# Patient Record
Sex: MALE | Race: WHITE | NOT HISPANIC OR LATINO | Employment: OTHER | URBAN - METROPOLITAN AREA
[De-identification: names, ages, dates, MRNs, and addresses within clinical notes are randomized per-mention and may not be internally consistent; named-entity substitution may affect disease eponyms.]

---

## 2021-03-17 ENCOUNTER — IMMUNIZATION (OUTPATIENT)
Dept: INTERNAL MEDICINE | Facility: CLINIC | Age: 66
End: 2021-03-17
Payer: MEDICARE

## 2021-03-17 DIAGNOSIS — Z23 NEED FOR VACCINATION: Primary | ICD-10-CM

## 2021-03-17 PROCEDURE — 91300 COVID-19, MRNA, LNP-S, PF, 30 MCG/0.3 ML DOSE VACCINE: CPT | Mod: PBBFAC | Performed by: FAMILY MEDICINE

## 2021-04-07 ENCOUNTER — IMMUNIZATION (OUTPATIENT)
Dept: INTERNAL MEDICINE | Facility: CLINIC | Age: 66
End: 2021-04-07
Payer: MEDICARE

## 2021-04-07 DIAGNOSIS — Z23 NEED FOR VACCINATION: Primary | ICD-10-CM

## 2021-04-07 PROCEDURE — 91300 COVID-19, MRNA, LNP-S, PF, 30 MCG/0.3 ML DOSE VACCINE: CPT | Mod: PBBFAC | Performed by: FAMILY MEDICINE

## 2021-04-07 PROCEDURE — 0002A COVID-19, MRNA, LNP-S, PF, 30 MCG/0.3 ML DOSE VACCINE: CPT | Mod: PBBFAC | Performed by: FAMILY MEDICINE

## 2023-01-20 ENCOUNTER — HOSPITAL ENCOUNTER (EMERGENCY)
Facility: HOSPITAL | Age: 68
Discharge: HOME | End: 2023-01-20
Attending: EMERGENCY MEDICINE
Payer: MEDICARE

## 2023-01-20 VITALS
SYSTOLIC BLOOD PRESSURE: 134 MMHG | HEART RATE: 82 BPM | OXYGEN SATURATION: 96 % | RESPIRATION RATE: 20 BRPM | TEMPERATURE: 98 F | BODY MASS INDEX: 39.17 KG/M2 | DIASTOLIC BLOOD PRESSURE: 91 MMHG | WEIGHT: 315 LBS | HEIGHT: 75 IN

## 2023-01-20 DIAGNOSIS — S68.119A AMPUTATION OF FINGER TIP, INITIAL ENCOUNTER: Primary | ICD-10-CM

## 2023-01-20 PROCEDURE — 99284 EMERGENCY DEPT VISIT MOD MDM: CPT | Mod: 25,ER

## 2023-01-20 PROCEDURE — 63600175 PHARM REV CODE 636 W HCPCS: Mod: ER | Performed by: PHYSICIAN ASSISTANT

## 2023-01-20 PROCEDURE — 96372 THER/PROPH/DIAG INJ SC/IM: CPT | Performed by: PHYSICIAN ASSISTANT

## 2023-01-20 PROCEDURE — 25000003 PHARM REV CODE 250: Mod: ER | Performed by: PHYSICIAN ASSISTANT

## 2023-01-20 PROCEDURE — 29130 APPL FINGER SPLINT STATIC: CPT | Mod: LT,ER

## 2023-01-20 RX ORDER — CEPHALEXIN 500 MG/1
500 CAPSULE ORAL EVERY 8 HOURS
Qty: 15 CAPSULE | Refills: 0 | Status: SHIPPED | OUTPATIENT
Start: 2023-01-20 | End: 2023-01-25

## 2023-01-20 RX ORDER — BUPIVACAINE HYDROCHLORIDE 5 MG/ML
10 INJECTION, SOLUTION EPIDURAL; INTRACAUDAL
Status: COMPLETED | OUTPATIENT
Start: 2023-01-20 | End: 2023-01-20

## 2023-01-20 RX ORDER — HYDROCODONE BITARTRATE AND ACETAMINOPHEN 10; 325 MG/1; MG/1
1 TABLET ORAL EVERY 6 HOURS PRN
Qty: 18 TABLET | Refills: 0 | Status: SHIPPED | OUTPATIENT
Start: 2023-01-20

## 2023-01-20 RX ORDER — CEFAZOLIN SODIUM 1 G/3ML
1 INJECTION, POWDER, FOR SOLUTION INTRAMUSCULAR; INTRAVENOUS
Status: COMPLETED | OUTPATIENT
Start: 2023-01-20 | End: 2023-01-20

## 2023-01-20 RX ORDER — HYDROCODONE BITARTRATE AND ACETAMINOPHEN 10; 325 MG/1; MG/1
1 TABLET ORAL
Status: COMPLETED | OUTPATIENT
Start: 2023-01-20 | End: 2023-01-20

## 2023-01-20 RX ADMIN — BUPIVACAINE HYDROCHLORIDE 50 MG: 5 INJECTION, SOLUTION EPIDURAL; INTRACAUDAL; PERINEURAL at 05:01

## 2023-01-20 RX ADMIN — CEFAZOLIN 1 G: 330 INJECTION, POWDER, FOR SOLUTION INTRAMUSCULAR; INTRAVENOUS at 07:01

## 2023-01-20 RX ADMIN — HYDROCODONE BITARTRATE AND ACETAMINOPHEN 1 TABLET: 10; 325 TABLET ORAL at 05:01

## 2023-01-21 NOTE — ED PROVIDER NOTES
History      Chief Complaint   Patient presents with    Finger Injury     L thumb cut with a electric saw , bleeding under control, mangled thumb, unknown last T Dap        Review of patient's allergies indicates:  No Known Allergies     HPI   HPI    1/20/2023, 6:40 PM   History obtained from the patient      History of Present Illness: Jose Elias Allen is a 67 y.o. male patient who presents to the Emergency Department for left thumb tip cut off from skill saw pta.  Last td within 5 years per epic. Symptoms are moderate in severity.     No further complaints or concerns at this time.           PCP: Tulio Guillory MD       Past Medical History:  Past Medical History:   Diagnosis Date    Head trauma     Hypertension     PTSD (post-traumatic stress disorder)          Past Surgical History:  Past Surgical History:   Procedure Laterality Date    APPENDECTOMY             Family History:  No family history on file.        Social History:  Social History     Tobacco Use    Smoking status: Every Day     Types: Cigarettes    Smokeless tobacco: Not on file   Substance and Sexual Activity    Alcohol use: No    Drug use: No    Sexual activity: Not on file       ROS     Review of Systems   Constitutional:  Negative for chills and fever.   HENT:  Negative for facial swelling and trouble swallowing.    Eyes:  Negative for pain and discharge.   Respiratory:  Negative for chest tightness and shortness of breath.    Cardiovascular:  Negative for palpitations and leg swelling.   Gastrointestinal:  Negative for diarrhea and vomiting.   Endocrine: Negative for polydipsia and polyuria.   Genitourinary:  Negative for decreased urine volume and flank pain.   Musculoskeletal:  Negative for joint swelling and neck stiffness.   Skin:  Positive for wound. Negative for rash.   Neurological:  Negative for syncope and light-headedness.   All other systems reviewed and are negative.    Physical Exam      Initial Vitals [01/20/23 1731]   BP Pulse  "Resp Temp SpO2   (!) 141/70 84 (!) 22 97.6 °F (36.4 °C) 95 %      MAP       --         Physical Exam  Vital signs and nursing notes reviewed.  Constitutional: Patient is in NAD. Awake and alert. Well-developed and well-nourished.  Head: Atraumatic. Normocephalic.  Eyes: PERRL. EOM intact. Conjunctivae nl. No scleral icterus.  ENT: Mucous membranes are moist. Oropharynx is clear.  Neck: Supple. No JVD. No lymphadenopathy.  No meningismus  Cardiovascular: Regular rate and rhythm. No murmurs, rubs, or gallops. Distal pulses are 2+ and symmetric.  Pulmonary/Chest: No respiratory distress. Clear to auscultation bilaterally. No wheezing, rales, or rhonchi.  Abdominal: Soft. Non-distended. No TTP. No rebound, guarding, or rigidity. Good bowel sounds.  Genitourinary: No CVA tenderness  Musculoskeletal: Moves all extremities. No edema.  Partial fingertip amputation to left thumb.  Bone covered with soft tissue            Skin: Warm and dry.  Neurological: Awake and alert. No acute focal neurological deficits are appreciated.  Psychiatric: Normal affect. Good eye contact. Appropriate in content.      ED Course          Splint Application    Date/Time: 1/20/2023 7:58 PM  Performed by: SULTANA Reddy  Authorized by: Winston Rapp MD   Location details: left thumb  Splint type: static finger  Supplies used: aluminum splint  Post-procedure: The splinted body part was neurovascularly unchanged following the procedure.  Patient tolerance: Patient tolerated the procedure well with no immediate complications      ED Vital Signs:  Vitals:    01/20/23 1731 01/20/23 1751 01/20/23 1752 01/20/23 1933   BP: (!) 141/70 (!) 153/65  (!) 134/91   Pulse: 84 82     Resp: (!) 22 20 20    Temp: 97.6 °F (36.4 °C)      TempSrc: Oral      SpO2: 95% 96%     Weight: (!) 143 kg (315 lb 4.1 oz)      Height: 6' 3" (1.905 m)                    Imaging Results:  Imaging Results              X-Ray Finger 2 or More Views Left (Final " result)  Result time 01/20/23 18:57:21      Final result by Sandi Motley MD (01/20/23 18:57:21)                   Impression:      As above      Electronically signed by: Tolu Junior  Date:    01/20/2023  Time:    18:57               Narrative:    EXAMINATION:  XR FINGER 2 OR MORE VIEWS LEFT    CLINICAL HISTORY:  XR FINGER 2 OR MORE VIEWS LEFT    COMPARISON:  None    FINDINGS:  Multiple radiographic views  were obtained.    Tuft fracture with missing portion of the soft tissues and bony structures of the distal thumb.                                         The Emergency Provider reviewed the vital signs and test results, which are outlined above.    ED Discussion             Medication(s) given in the ER:  Medications   BUPivacaine (PF) 0.5% (5 mg/mL) injection 50 mg (50 mg Subcutaneous Given 1/20/23 1754)   HYDROcodone-acetaminophen  mg per tablet 1 tablet (1 tablet Oral Given 1/20/23 1752)   ceFAZolin injection 1 g (1 g Intramuscular Given 1/20/23 1931)            Follow-up Information       Pradeep Madden MD. Schedule an appointment as soon as possible for a visit in 3 days.    Specialties: Hand Surgery, Orthopedic Surgery  Contact information:  [address] [place] [address] [address]  [place] [address] [phone]                                    Medication List        START taking these medications      cephALEXin 500 MG capsule  Commonly known as: KEFLEX  Take 1 capsule (500 mg total) by mouth every 8 (eight) hours. for 5 days     HYDROcodone-acetaminophen  mg per tablet  Commonly known as: NORCO  Take 1 tablet by mouth every 6 (six) hours as needed for Pain.               Where to Get Your Medications        [place] [facility] [place] [address] [address]  [address] [address]      Phone: [phone]   cephALEXin 500 MG capsule  HYDROcodone-acetaminophen  mg per tablet             Medical Decision Making         All findings were reviewed with the patient/family in detail.   All remaining questions and concerns were addressed at that time.  Patient/family has been counseled regarding the need for follow-up as well as the indication to return to the emergency room should new or worrisome developments occur.    Medical Decision Making:   Clinical Tests:   Radiological Study: Ordered and Reviewed  Other:   I have discussed this case with another health care provider.       <> Summary of the Discussion: Discussed with Dr. Houston, who agrees with plan antibiotics, irrigate and clean wound well, follow-up as an outpatient with hand surgeon   [facility]                 Clinical Impression:        ICD-10-CM ICD-9-CM   1. Amputation of finger tip, initial encounter  S68.119A 886.0               Marisela Hays PA-C  01/20/23 1959

## 2023-01-23 ENCOUNTER — TELEPHONE (OUTPATIENT)
Dept: ORTHOPEDICS | Facility: CLINIC | Age: 68
End: 2023-01-23
Payer: MEDICARE

## 2023-01-23 NOTE — TELEPHONE ENCOUNTER
Phoned patient to schedule his ER follow up appointment. Left a message at both numbers for patient or his wife to call back to schedule.

## 2023-01-23 NOTE — TELEPHONE ENCOUNTER
----- Message from Will Salazar PA-C sent at 1/21/2023  1:22 PM CST -----  This patient needs ortho trauma clinic follow-up this week

## 2023-01-24 ENCOUNTER — TELEPHONE (OUTPATIENT)
Dept: ORTHOPEDICS | Facility: CLINIC | Age: 68
End: 2023-01-24
Payer: MEDICARE

## 2023-01-24 NOTE — TELEPHONE ENCOUNTER
Patient being treated outside of [facility]    ----- Message from Will Salazar PA-C sent at 1/21/2023  1:22 PM CST -----  This patient needs ortho trauma clinic follow-up this week

## 2023-01-24 NOTE — TELEPHONE ENCOUNTER
----- Message from Marry Anthony sent at 1/24/2023  9:14 AM CST -----  Contact: Laurie/ wife  Type:  Patient Returning Call    Who Called:Laurie  Who Left Message for Patient:Kari Park MA   Does the patient know what this is regarding?:Er follow up  Would the patient rather a call back or a response via [facility]? neither  Best Call Back Number:no call back needed  Additional Information: Patient is being seen elsewhere

## 2023-01-24 NOTE — TELEPHONE ENCOUNTER
Phoned patient to schedule his ER follow up appointment. Left a message at both numbers for patient or his wife to call back to schedule.       ----- Message from Will Salazar PA-C sent at 1/21/2023  1:22 PM CST -----  This patient needs ortho trauma clinic follow-up this week

## 2024-05-27 ENCOUNTER — HOSPITAL ENCOUNTER (EMERGENCY)
Facility: HOSPITAL | Age: 69
Discharge: HOME | End: 2024-05-27
Attending: EMERGENCY MEDICINE
Payer: MEDICARE

## 2024-05-27 VITALS
TEMPERATURE: 98 F | BODY MASS INDEX: 37.12 KG/M2 | HEART RATE: 79 BPM | SYSTOLIC BLOOD PRESSURE: 160 MMHG | WEIGHT: 297 LBS | DIASTOLIC BLOOD PRESSURE: 74 MMHG | RESPIRATION RATE: 20 BRPM | OXYGEN SATURATION: 95 %

## 2024-05-27 DIAGNOSIS — M54.2 ACUTE NECK PAIN: ICD-10-CM

## 2024-05-27 DIAGNOSIS — G44.319 ACUTE POST-TRAUMATIC HEADACHE, NOT INTRACTABLE: Primary | ICD-10-CM
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
## 2024-05-27 LAB — HEP C VIRUS HOLD SPECIMEN: NORMAL

## 2024-05-27 PROCEDURE — 99284 EMERGENCY DEPT VISIT MOD MDM: CPT | Mod: 25,ER

## 2024-05-27 PROCEDURE — 25000003 PHARM REV CODE 250: Mod: ER | Performed by: EMERGENCY MEDICINE

## 2024-05-27 PROCEDURE — 86803 HEPATITIS C AB TEST: CPT | Performed by: EMERGENCY MEDICINE

## 2024-05-27 PROCEDURE — 87389 HIV-1 AG W/HIV-1&-2 AB AG IA: CPT | Performed by: EMERGENCY MEDICINE

## 2024-05-27 RX ORDER — HYDROCODONE BITARTRATE AND ACETAMINOPHEN 5; 325 MG/1; MG/1
1 TABLET ORAL
Status: COMPLETED | OUTPATIENT
Start: 2024-05-27 | End: 2024-05-27

## 2024-05-27 RX ORDER — ONDANSETRON 4 MG/1
4 TABLET, ORALLY DISINTEGRATING ORAL
Status: COMPLETED | OUTPATIENT
Start: 2024-05-27 | End: 2024-05-27

## 2024-05-27 RX ORDER — CYCLOBENZAPRINE HCL 10 MG
10 TABLET ORAL 3 TIMES DAILY PRN
Qty: 15 TABLET | Refills: 0 | Status: SHIPPED | OUTPATIENT
Start: 2024-05-27

## 2024-05-27 RX ADMIN — ONDANSETRON 4 MG: 4 TABLET, ORALLY DISINTEGRATING ORAL at 07:05

## 2024-05-27 RX ADMIN — HYDROCODONE BITARTRATE AND ACETAMINOPHEN 1 TABLET: 5; 325 TABLET ORAL at 07:05
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
## 2024-05-28 LAB
HCV AB SERPL QL IA: NEGATIVE
HIV 1+2 AB+HIV1 P24 AG SERPL QL IA: NEGATIVE

## 2025-07-01 ENCOUNTER — HOSPITAL ENCOUNTER (INPATIENT)
Facility: HOSPITAL | Age: 70
LOS: 2 days | Discharge: HOME | DRG: 684 | End: 2025-07-03
Attending: INTERNAL MEDICINE | Admitting: INTERNAL MEDICINE
Payer: MEDICARE

## 2025-07-01 DIAGNOSIS — E11.9 TYPE 2 DIABETES MELLITUS WITHOUT COMPLICATION, WITHOUT LONG-TERM CURRENT USE OF INSULIN: ICD-10-CM

## 2025-07-01 DIAGNOSIS — E78.5 HYPERLIPIDEMIA, UNSPECIFIED HYPERLIPIDEMIA TYPE: ICD-10-CM

## 2025-07-01 DIAGNOSIS — I95.9 HYPOTENSION: ICD-10-CM

## 2025-07-01 DIAGNOSIS — E86.0 DEHYDRATION: ICD-10-CM

## 2025-07-01 DIAGNOSIS — N17.9 AKI (ACUTE KIDNEY INJURY): Primary | ICD-10-CM

## 2025-07-01 DIAGNOSIS — I10 PRIMARY HYPERTENSION: ICD-10-CM

## 2025-07-01 DIAGNOSIS — N19 RENAL FAILURE: ICD-10-CM

## 2025-07-01 LAB
ABSOLUTE EOSINOPHIL (OHS): 0.19 K/UL
ABSOLUTE MONOCYTE (OHS): 0.96 K/UL (ref 0.3–1)
ABSOLUTE NEUTROPHIL COUNT (OHS): 4.93 K/UL (ref 1.8–7.7)
ALBUMIN SERPL BCP-MCNC: 3.5 G/DL (ref 3.5–5.2)
ALP SERPL-CCNC: 76 UNIT/L (ref 40–150)
ALT SERPL W/O P-5'-P-CCNC: 38 UNIT/L (ref 10–44)
ANION GAP (OHS): 14 MMOL/L (ref 8–16)
AST SERPL-CCNC: 34 UNIT/L (ref 11–45)
BACTERIA #/AREA URNS AUTO: ABNORMAL /HPF
BASOPHILS # BLD AUTO: 0.03 K/UL
BASOPHILS NFR BLD AUTO: 0.4 %
BILIRUB SERPL-MCNC: 0.4 MG/DL (ref 0.1–1)
BILIRUB UR QL STRIP.AUTO: ABNORMAL
BUN SERPL-MCNC: 90 MG/DL (ref 8–23)
CALCIUM SERPL-MCNC: 8.4 MG/DL (ref 8.7–10.5)
CAOX CRY URNS QL MICRO: ABNORMAL
CHLORIDE SERPL-SCNC: 110 MMOL/L (ref 95–110)
CLARITY UR: ABNORMAL
CO2 SERPL-SCNC: 16 MMOL/L (ref 23–29)
COLOR UR AUTO: YELLOW
CREAT SERPL-MCNC: 8 MG/DL (ref 0.5–1.4)
ERYTHROCYTE [DISTWIDTH] IN BLOOD BY AUTOMATED COUNT: 12.1 % (ref 11.5–14.5)
GFR SERPLBLD CREATININE-BSD FMLA CKD-EPI: 7 ML/MIN/1.73/M2
GLUCOSE SERPL-MCNC: 84 MG/DL (ref 70–110)
GLUCOSE UR QL STRIP: NEGATIVE
GRAN CASTS #/AREA URNS LPF: 2 /LPF (ref ?–0)
HCT VFR BLD AUTO: 38.1 % (ref 40–54)
HGB BLD-MCNC: 13.4 GM/DL (ref 14–18)
HGB UR QL STRIP: NEGATIVE
HYALINE CASTS UR QL AUTO: >10 /LPF (ref 0–1)
IMM GRANULOCYTES # BLD AUTO: 0.03 K/UL (ref 0–0.04)
IMM GRANULOCYTES NFR BLD AUTO: 0.4 % (ref 0–0.5)
KETONES UR QL STRIP: ABNORMAL
LACTATE SERPL-SCNC: 1.2 MMOL/L (ref 0.5–2.2)
LEUKOCYTE ESTERASE UR QL STRIP: ABNORMAL
LYMPHOCYTES # BLD AUTO: 1.59 K/UL (ref 1–4.8)
MCH RBC QN AUTO: 32.4 PG (ref 27–31)
MCHC RBC AUTO-ENTMCNC: 35.2 G/DL (ref 32–36)
MCV RBC AUTO: 92 FL (ref 82–98)
MICROSCOPIC COMMENT: ABNORMAL
NITRITE UR QL STRIP: NEGATIVE
NUCLEATED RBC (/100WBC) (OHS): 0 /100 WBC
PH UR STRIP: 5 [PH]
PLATELET # BLD AUTO: 276 K/UL (ref 150–450)
PMV BLD AUTO: 10.4 FL (ref 9.2–12.9)
POTASSIUM SERPL-SCNC: 4.2 MMOL/L (ref 3.5–5.1)
PROT SERPL-MCNC: 6.9 GM/DL (ref 6–8.4)
PROT UR QL STRIP: ABNORMAL
RBC # BLD AUTO: 4.13 M/UL (ref 4.6–6.2)
RBC #/AREA URNS AUTO: 0 /HPF (ref 0–4)
RELATIVE EOSINOPHIL (OHS): 2.5 %
RELATIVE LYMPHOCYTE (OHS): 20.6 % (ref 18–48)
RELATIVE MONOCYTE (OHS): 12.4 % (ref 4–15)
RELATIVE NEUTROPHIL (OHS): 63.7 % (ref 38–73)
SODIUM SERPL-SCNC: 140 MMOL/L (ref 136–145)
SP GR UR STRIP: 1.02
SQUAMOUS #/AREA URNS AUTO: 7 /HPF
TROPONIN I SERPL HS-MCNC: 6 NG/L
TROPONIN I SERPL HS-MCNC: 6 NG/L
UROBILINOGEN UR STRIP-ACNC: 1 EU/DL
WBC # BLD AUTO: 7.73 K/UL (ref 3.9–12.7)
WBC #/AREA URNS AUTO: 17 /HPF (ref 0–5)
YEAST URNS QL MICRO: ABNORMAL /HPF

## 2025-07-01 PROCEDURE — 36415 COLL VENOUS BLD VENIPUNCTURE: CPT | Performed by: EMERGENCY MEDICINE

## 2025-07-01 PROCEDURE — 87086 URINE CULTURE/COLONY COUNT: CPT | Performed by: INTERNAL MEDICINE

## 2025-07-01 PROCEDURE — 25000003 PHARM REV CODE 250: Performed by: INTERNAL MEDICINE

## 2025-07-01 PROCEDURE — 96361 HYDRATE IV INFUSION ADD-ON: CPT

## 2025-07-01 PROCEDURE — 96374 THER/PROPH/DIAG INJ IV PUSH: CPT

## 2025-07-01 PROCEDURE — 93010 ELECTROCARDIOGRAM REPORT: CPT | Mod: ,,, | Performed by: INTERNAL MEDICINE

## 2025-07-01 PROCEDURE — 87040 BLOOD CULTURE FOR BACTERIA: CPT | Performed by: INTERNAL MEDICINE

## 2025-07-01 PROCEDURE — 85025 COMPLETE CBC W/AUTO DIFF WBC: CPT | Performed by: INTERNAL MEDICINE

## 2025-07-01 PROCEDURE — 83605 ASSAY OF LACTIC ACID: CPT | Performed by: INTERNAL MEDICINE

## 2025-07-01 PROCEDURE — 36415 COLL VENOUS BLD VENIPUNCTURE: CPT | Performed by: INTERNAL MEDICINE

## 2025-07-01 PROCEDURE — 11000001 HC ACUTE MED/SURG PRIVATE ROOM

## 2025-07-01 PROCEDURE — 84484 ASSAY OF TROPONIN QUANT: CPT | Performed by: EMERGENCY MEDICINE

## 2025-07-01 PROCEDURE — 81001 URINALYSIS AUTO W/SCOPE: CPT | Performed by: INTERNAL MEDICINE

## 2025-07-01 PROCEDURE — 80053 COMPREHEN METABOLIC PANEL: CPT | Performed by: INTERNAL MEDICINE

## 2025-07-01 PROCEDURE — 25000003 PHARM REV CODE 250: Performed by: EMERGENCY MEDICINE

## 2025-07-01 PROCEDURE — 63600175 PHARM REV CODE 636 W HCPCS: Performed by: EMERGENCY MEDICINE

## 2025-07-01 PROCEDURE — 93005 ELECTROCARDIOGRAM TRACING: CPT

## 2025-07-01 PROCEDURE — 99285 EMERGENCY DEPT VISIT HI MDM: CPT | Mod: 25

## 2025-07-01 PROCEDURE — 84484 ASSAY OF TROPONIN QUANT: CPT | Performed by: INTERNAL MEDICINE

## 2025-07-01 RX ORDER — TALC
6 POWDER (GRAM) TOPICAL NIGHTLY PRN
Status: DISCONTINUED | OUTPATIENT
Start: 2025-07-01 | End: 2025-07-03 | Stop reason: HOSPADM

## 2025-07-01 RX ORDER — BENAZEPRIL HYDROCHLORIDE 20 MG/1
20 TABLET ORAL
COMMUNITY
Start: 2024-12-03

## 2025-07-01 RX ORDER — METRONIDAZOLE 500 MG/100ML
500 INJECTION, SOLUTION INTRAVENOUS
Status: COMPLETED | OUTPATIENT
Start: 2025-07-01 | End: 2025-07-01

## 2025-07-01 RX ORDER — PHENYLEPHRINE HYDROCHLORIDE 10 MG/ML
100 INJECTION INTRAVENOUS ONCE
Status: COMPLETED | OUTPATIENT
Start: 2025-07-01 | End: 2025-07-01

## 2025-07-01 RX ORDER — CIPROFLOXACIN 2 MG/ML
400 INJECTION, SOLUTION INTRAVENOUS
Status: COMPLETED | OUTPATIENT
Start: 2025-07-01 | End: 2025-07-01

## 2025-07-01 RX ORDER — OMEPRAZOLE 20 MG/1
20 CAPSULE, DELAYED RELEASE ORAL
COMMUNITY
Start: 2025-06-23

## 2025-07-01 RX ORDER — SODIUM CHLORIDE 0.9 % (FLUSH) 0.9 %
10 SYRINGE (ML) INJECTION
Status: DISCONTINUED | OUTPATIENT
Start: 2025-07-01 | End: 2025-07-03 | Stop reason: HOSPADM

## 2025-07-01 RX ORDER — LORAZEPAM 0.5 MG/1
TABLET ORAL
COMMUNITY

## 2025-07-01 RX ORDER — MIRABEGRON 50 MG/1
1 TABLET, FILM COATED, EXTENDED RELEASE ORAL
COMMUNITY
Start: 2025-05-16

## 2025-07-01 RX ORDER — ATORVASTATIN CALCIUM 80 MG/1
80 TABLET, FILM COATED ORAL
COMMUNITY
Start: 2025-07-01

## 2025-07-01 RX ORDER — METFORMIN HYDROCHLORIDE 500 MG/1
1000 TABLET ORAL
COMMUNITY
Start: 2025-07-01

## 2025-07-01 RX ADMIN — SODIUM CHLORIDE 1000 ML: 9 INJECTION, SOLUTION INTRAVENOUS at 05:07

## 2025-07-01 RX ADMIN — PHENYLEPHRINE HYDROCHLORIDE 100 MCG: 10 INJECTION INTRAVENOUS at 08:07

## 2025-07-01 RX ADMIN — SODIUM CHLORIDE, POTASSIUM CHLORIDE, SODIUM LACTATE AND CALCIUM CHLORIDE 1000 ML: 600; 310; 30; 20 INJECTION, SOLUTION INTRAVENOUS at 08:07

## 2025-07-01 RX ADMIN — CIPROFLOXACIN 400 MG: 2 INJECTION, SOLUTION INTRAVENOUS at 10:07

## 2025-07-01 RX ADMIN — Medication 6 MG: at 11:07

## 2025-07-01 RX ADMIN — METRONIDAZOLE 500 MG: 5 INJECTION, SOLUTION INTRAVENOUS at 09:07
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
## 2025-07-02 PROBLEM — E11.9 TYPE 2 DIABETES MELLITUS WITHOUT COMPLICATION, WITHOUT LONG-TERM CURRENT USE OF INSULIN: Status: ACTIVE | Noted: 2025-07-02

## 2025-07-02 PROBLEM — N17.9 AKI (ACUTE KIDNEY INJURY): Status: ACTIVE | Noted: 2025-07-02

## 2025-07-02 PROBLEM — Z79.899 ON DEEP VEIN THROMBOSIS (DVT) PROPHYLAXIS: Status: ACTIVE | Noted: 2025-07-02

## 2025-07-02 PROBLEM — R19.7 DIARRHEA: Status: ACTIVE | Noted: 2025-07-02

## 2025-07-02 PROBLEM — I10 PRIMARY HYPERTENSION: Status: ACTIVE | Noted: 2025-07-02

## 2025-07-02 PROBLEM — E78.5 HLD (HYPERLIPIDEMIA): Status: ACTIVE | Noted: 2025-07-02

## 2025-07-02 PROBLEM — E86.0 DEHYDRATION: Status: ACTIVE | Noted: 2025-07-02

## 2025-07-02 LAB
ABSOLUTE EOSINOPHIL (OHS): 0.25 K/UL
ABSOLUTE MONOCYTE (OHS): 0.83 K/UL (ref 0.3–1)
ABSOLUTE NEUTROPHIL COUNT (OHS): 4.64 K/UL (ref 1.8–7.7)
ALBUMIN SERPL BCP-MCNC: 3.4 G/DL (ref 3.5–5.2)
ALP SERPL-CCNC: 70 UNIT/L (ref 40–150)
ALT SERPL W/O P-5'-P-CCNC: 35 UNIT/L (ref 10–44)
ANION GAP (OHS): 9 MMOL/L (ref 8–16)
AST SERPL-CCNC: 31 UNIT/L (ref 11–45)
BASOPHILS # BLD AUTO: 0.03 K/UL
BASOPHILS NFR BLD AUTO: 0.4 %
BILIRUB SERPL-MCNC: 0.4 MG/DL (ref 0.1–1)
BUN SERPL-MCNC: 78 MG/DL (ref 8–23)
CALCIUM SERPL-MCNC: 8.4 MG/DL (ref 8.7–10.5)
CHLORIDE SERPL-SCNC: 112 MMOL/L (ref 95–110)
CK SERPL-CCNC: 263 U/L (ref 20–200)
CO2 SERPL-SCNC: 19 MMOL/L (ref 23–29)
CREAT SERPL-MCNC: 4.6 MG/DL (ref 0.5–1.4)
EAG (OHS): 114 MG/DL (ref 68–131)
ERYTHROCYTE [DISTWIDTH] IN BLOOD BY AUTOMATED COUNT: 12.1 % (ref 11.5–14.5)
GFR SERPLBLD CREATININE-BSD FMLA CKD-EPI: 13 ML/MIN/1.73/M2
GLUCOSE SERPL-MCNC: 100 MG/DL (ref 70–110)
HBA1C MFR BLD: 5.6 % (ref 4–5.6)
HCT VFR BLD AUTO: 36.7 % (ref 40–54)
HGB BLD-MCNC: 12.9 GM/DL (ref 14–18)
HOLD SPECIMEN: NORMAL
IMM GRANULOCYTES # BLD AUTO: 0.04 K/UL (ref 0–0.04)
IMM GRANULOCYTES NFR BLD AUTO: 0.6 % (ref 0–0.5)
LYMPHOCYTES # BLD AUTO: 1.25 K/UL (ref 1–4.8)
MAGNESIUM SERPL-MCNC: 1.6 MG/DL (ref 1.6–2.6)
MCH RBC QN AUTO: 32.5 PG (ref 27–31)
MCHC RBC AUTO-ENTMCNC: 35.1 G/DL (ref 32–36)
MCV RBC AUTO: 92 FL (ref 82–98)
NUCLEATED RBC (/100WBC) (OHS): 0 /100 WBC
OHS QRS DURATION: 104 MS
OHS QTC CALCULATION: 450 MS
PLATELET # BLD AUTO: 247 K/UL (ref 150–450)
PMV BLD AUTO: 10.2 FL (ref 9.2–12.9)
POTASSIUM SERPL-SCNC: 4.4 MMOL/L (ref 3.5–5.1)
PROT SERPL-MCNC: 6.4 GM/DL (ref 6–8.4)
RBC # BLD AUTO: 3.97 M/UL (ref 4.6–6.2)
RELATIVE EOSINOPHIL (OHS): 3.6 %
RELATIVE LYMPHOCYTE (OHS): 17.8 % (ref 18–48)
RELATIVE MONOCYTE (OHS): 11.8 % (ref 4–15)
RELATIVE NEUTROPHIL (OHS): 65.8 % (ref 38–73)
SODIUM SERPL-SCNC: 140 MMOL/L (ref 136–145)
WBC # BLD AUTO: 7.04 K/UL (ref 3.9–12.7)

## 2025-07-02 PROCEDURE — 25000003 PHARM REV CODE 250: Performed by: EMERGENCY MEDICINE

## 2025-07-02 PROCEDURE — 11000001 HC ACUTE MED/SURG PRIVATE ROOM

## 2025-07-02 PROCEDURE — 36415 COLL VENOUS BLD VENIPUNCTURE: CPT

## 2025-07-02 PROCEDURE — 27000207 HC ISOLATION

## 2025-07-02 PROCEDURE — 80053 COMPREHEN METABOLIC PANEL: CPT

## 2025-07-02 PROCEDURE — 85025 COMPLETE CBC W/AUTO DIFF WBC: CPT

## 2025-07-02 PROCEDURE — 63600175 PHARM REV CODE 636 W HCPCS

## 2025-07-02 PROCEDURE — 83735 ASSAY OF MAGNESIUM: CPT

## 2025-07-02 PROCEDURE — 25000003 PHARM REV CODE 250

## 2025-07-02 PROCEDURE — 83036 HEMOGLOBIN GLYCOSYLATED A1C: CPT

## 2025-07-02 PROCEDURE — 82550 ASSAY OF CK (CPK): CPT

## 2025-07-02 RX ORDER — GLUCAGON 1 MG
1 KIT INJECTION
Status: DISCONTINUED | OUTPATIENT
Start: 2025-07-02 | End: 2025-07-03 | Stop reason: HOSPADM

## 2025-07-02 RX ORDER — IBUPROFEN 200 MG
16 TABLET ORAL
Status: DISCONTINUED | OUTPATIENT
Start: 2025-07-02 | End: 2025-07-03 | Stop reason: HOSPADM

## 2025-07-02 RX ORDER — FLUOXETINE HYDROCHLORIDE 40 MG/1
40 CAPSULE ORAL DAILY
COMMUNITY

## 2025-07-02 RX ORDER — ATORVASTATIN CALCIUM 40 MG/1
40 TABLET, FILM COATED ORAL NIGHTLY
Status: DISCONTINUED | OUTPATIENT
Start: 2025-07-02 | End: 2025-07-03 | Stop reason: HOSPADM

## 2025-07-02 RX ORDER — ENOXAPARIN SODIUM 100 MG/ML
30 INJECTION SUBCUTANEOUS EVERY 24 HOURS
Status: DISCONTINUED | OUTPATIENT
Start: 2025-07-02 | End: 2025-07-03

## 2025-07-02 RX ORDER — ENOXAPARIN SODIUM 100 MG/ML
40 INJECTION SUBCUTANEOUS EVERY 24 HOURS
Status: DISCONTINUED | OUTPATIENT
Start: 2025-07-02 | End: 2025-07-02

## 2025-07-02 RX ORDER — CEFTRIAXONE 1 G/1
1 INJECTION, POWDER, FOR SOLUTION INTRAMUSCULAR; INTRAVENOUS
Status: DISCONTINUED | OUTPATIENT
Start: 2025-07-02 | End: 2025-07-03

## 2025-07-02 RX ORDER — IBUPROFEN 200 MG
24 TABLET ORAL
Status: DISCONTINUED | OUTPATIENT
Start: 2025-07-02 | End: 2025-07-03 | Stop reason: HOSPADM

## 2025-07-02 RX ORDER — FLUOXETINE 20 MG/1
40 CAPSULE ORAL DAILY
Status: DISCONTINUED | OUTPATIENT
Start: 2025-07-03 | End: 2025-07-03 | Stop reason: HOSPADM

## 2025-07-02 RX ORDER — INSULIN ASPART 100 [IU]/ML
0-5 INJECTION, SOLUTION INTRAVENOUS; SUBCUTANEOUS
Status: DISCONTINUED | OUTPATIENT
Start: 2025-07-02 | End: 2025-07-03 | Stop reason: HOSPADM

## 2025-07-02 RX ORDER — SODIUM CHLORIDE 9 MG/ML
INJECTION, SOLUTION INTRAVENOUS CONTINUOUS
Status: DISCONTINUED | OUTPATIENT
Start: 2025-07-02 | End: 2025-07-03 | Stop reason: HOSPADM

## 2025-07-02 RX ADMIN — Medication 6 MG: at 08:07

## 2025-07-02 RX ADMIN — ATORVASTATIN CALCIUM 40 MG: 40 TABLET, FILM COATED ORAL at 08:07

## 2025-07-02 RX ADMIN — CEFTRIAXONE SODIUM 1 G: 1 INJECTION, POWDER, FOR SOLUTION INTRAMUSCULAR; INTRAVENOUS at 10:07

## 2025-07-02 RX ADMIN — SODIUM CHLORIDE: 9 INJECTION, SOLUTION INTRAVENOUS at 10:07

## 2025-07-02 RX ADMIN — SODIUM CHLORIDE: 9 INJECTION, SOLUTION INTRAVENOUS at 05:07

## 2025-07-02 RX ADMIN — CEFTRIAXONE SODIUM 1 G: 1 INJECTION, POWDER, FOR SOLUTION INTRAMUSCULAR; INTRAVENOUS at 09:07

## 2025-07-02 RX ADMIN — ENOXAPARIN SODIUM 30 MG: 30 INJECTION SUBCUTANEOUS at 05:07

## 2025-07-02 NOTE — PROGRESS NOTES
Pharmacist Renal Dose Adjustment Note    Jose Elias Allen is a 69 y.o. male being treated with the medication lovenox    Patient Data:    Vital Signs (Most Recent):  Temp: 97.6 °F (36.4 °C) (07/02/25 0808)  Pulse: 67 (07/02/25 0808)  Resp: 18 (07/02/25 0808)  BP: (!) 106/57 (07/02/25 0808)  SpO2: 97 % (07/02/25 0808) Vital Signs (72h Range):  Temp:  [97.6 °F (36.4 °C)-97.9 °F (36.6 °C)]   Pulse:  [62-84]   Resp:  [15-22]   BP: ()/(41-65)   SpO2:  [96 %-100 %]      Recent Labs   Lab 07/01/25  1737 07/02/25 0857   CREATININE 8.0* 4.6*     Serum creatinine: 4.6 mg/dL (H) 07/02/25 0857  Estimated creatinine clearance: 21.3 mL/min (A)    Medication: lovenox 40 mg SQ daily will be changed to lovenox 30 m g SQ daily for CrCl <30 ml/min     Pharmacist's Name: Amy Merrill

## 2025-07-02 NOTE — SUBJECTIVE & OBJECTIVE
Past Medical History:   Diagnosis Date    Diabetes mellitus     Head trauma     Hypertension     PTSD (post-traumatic stress disorder)        Past Surgical History:   Procedure Laterality Date    APPENDECTOMY         Review of patient's allergies indicates:  No Known Allergies    No current facility-administered medications on file prior to encounter.     Current Outpatient Medications on File Prior to Encounter   Medication Sig    atorvastatin (LIPITOR) 80 MG tablet Take 80 mg by mouth.    benazepriL (LOTENSIN) 20 MG tablet Take 20 mg by mouth.    metFORMIN (GLUCOPHAGE) 500 MG tablet Take 1,000 mg by mouth.    MYRBETRIQ 50 mg Tb24 Take 1 tablet by mouth.    omeprazole (PRILOSEC) 20 MG capsule Take 20 mg by mouth.    LORazepam (ATIVAN) 0.5 MG tablet Take by mouth.     Family History    None       Tobacco Use    Smoking status: Every Day     Types: Cigarettes    Smokeless tobacco: Not on file   Substance and Sexual Activity    Alcohol use: No    Drug use: No    Sexual activity: Not on file     Review of Systems   Constitutional:  Positive for fatigue. Negative for activity change, appetite change, chills, diaphoresis and fever.   HENT:  Negative for congestion, ear pain, facial swelling, postnasal drip, rhinorrhea, sinus pain, sore throat and trouble swallowing.    Eyes:  Negative for photophobia, pain, redness and visual disturbance.   Respiratory:  Negative for cough, chest tightness, shortness of breath and wheezing.    Cardiovascular:  Negative for chest pain, palpitations and leg swelling.   Gastrointestinal:  Positive for diarrhea, nausea and vomiting. Negative for abdominal distention, abdominal pain, blood in stool and constipation.   Endocrine: Negative for polydipsia, polyphagia and polyuria.   Genitourinary:  Negative for difficulty urinating, dysuria, flank pain, frequency, hematuria, penile pain, testicular pain and urgency.   Musculoskeletal:  Negative for arthralgias, myalgias and neck stiffness.    Skin:  Negative for pallor, rash and wound.   Allergic/Immunologic: Negative.    Neurological:  Positive for weakness. Negative for dizziness, tremors, seizures, syncope, light-headedness and headaches.   Hematological: Negative.    Psychiatric/Behavioral:  Negative for agitation, confusion and sleep disturbance. The patient is not nervous/anxious.      Objective:     Vital Signs (Most Recent):  Temp: 97.6 °F (36.4 °C) (07/02/25 0808)  Pulse: 67 (07/02/25 0808)  Resp: 18 (07/02/25 0808)  BP: (!) 106/57 (07/02/25 0808)  SpO2: 97 % (07/02/25 0808) Vital Signs (24h Range):  Temp:  [97.6 °F (36.4 °C)-97.9 °F (36.6 °C)] 97.6 °F (36.4 °C)  Pulse:  [62-84] 67  Resp:  [15-22] 18  SpO2:  [96 %-100 %] 97 %  BP: ()/(41-65) 106/57     Weight: 125.2 kg (276 lb)  Body mass index is 37.43 kg/m².     Physical Exam  Vitals and nursing note reviewed.   Constitutional:       General: He is not in acute distress.     Appearance: Normal appearance. He is not ill-appearing.   HENT:      Head: Normocephalic and atraumatic.      Nose: Nose normal. No congestion or rhinorrhea.      Mouth/Throat:      Mouth: Mucous membranes are dry.      Pharynx: Oropharynx is clear. No oropharyngeal exudate or posterior oropharyngeal erythema.   Eyes:      General: No scleral icterus.     Extraocular Movements: Extraocular movements intact.      Conjunctiva/sclera: Conjunctivae normal.      Pupils: Pupils are equal, round, and reactive to light.   Neck:      Vascular: No carotid bruit.   Cardiovascular:      Rate and Rhythm: Normal rate and regular rhythm.      Pulses: Normal pulses.      Heart sounds: Normal heart sounds. No murmur heard.  Pulmonary:      Effort: Pulmonary effort is normal. No respiratory distress.      Breath sounds: Normal breath sounds. No wheezing, rhonchi or rales.   Abdominal:      General: Bowel sounds are normal. There is no distension.      Palpations: Abdomen is soft.      Tenderness: There is no abdominal tenderness.  There is no guarding or rebound.   Musculoskeletal:         General: Normal range of motion.      Cervical back: Normal range of motion and neck supple.      Right lower leg: No edema.      Left lower leg: No edema.   Lymphadenopathy:      Cervical: No cervical adenopathy.   Skin:     General: Skin is warm and dry.      Capillary Refill: Capillary refill takes less than 2 seconds.   Neurological:      General: No focal deficit present.      Mental Status: He is alert and oriented to person, place, and time.      Cranial Nerves: No cranial nerve deficit.      Motor: Weakness present.   Psychiatric:         Mood and Affect: Mood normal.         Behavior: Behavior normal.         Thought Content: Thought content normal.         Judgment: Judgment normal.              CRANIAL NERVES     CN III, IV, VI   Pupils are equal, round, and reactive to light.       Significant Labs: All pertinent labs within the past 24 hours have been reviewed.  Recent Lab Results  (Last 5 results in the past 24 hours)        07/02/25  1002   07/02/25  0857   07/01/25  1849   07/01/25  1836   07/01/25  1737        Albumin   3.4       3.5       ALP   70       76       ALT   35       38       Anion Gap   9       14       Appearance, UA       Cloudy         AST   31       34       Bacteria, UA       None         Baso #   0.03       0.03       Basophil %   0.4       0.4       Bilirubin (UA)       2+  Comment: Positive urine bilirubin is not confirmed. Correlate with serum bilirubin and clinical presentation.         BILIRUBIN TOTAL   0.4  Comment: For infants and newborns, interpretation of results should be based   on gestational age, weight and in agreement with clinical   observations.    Premature Infant recommended reference ranges:   0-24 hours:  <8.0 mg/dL   24-48 hours: <12.0 mg/dL   3-5 days:    <15.0 mg/dL   6-29 days:   <15.0 mg/dL       0.4  Comment: For infants and newborns, interpretation of results should be based   on gestational  age, weight and in agreement with clinical   observations.    Premature Infant recommended reference ranges:   0-24 hours:  <8.0 mg/dL   24-48 hours: <12.0 mg/dL   3-5 days:    <15.0 mg/dL   6-29 days:   <15.0 mg/dL       BUN   78       90       Ca Oxalate Aaliyah, UA       Few         Calcium   8.4       8.4       Chloride   112       110       CO2   19       16       Color, UA       Yellow                        Creatinine   4.6       8.0       eGFR   13  Comment: Estimated GFR calculated using the CKD-EPI creatinine (2021) equation.       7  Comment: Estimated GFR calculated using the CKD-EPI creatinine (2021) equation.       Eos #   0.25       0.19       Eos %   3.6       2.5       Estimated Avg Glucose 114               Glucose   100       84       Glucose, UA       Negative         Gran # (ANC)   4.64       4.93       Granular Casts, UA       2         Hematocrit   36.7       38.1       Hemoglobin   12.9       13.4       Hemoglobin A1C External 5.6  Comment: ADA Screening Guidelines:  5.7-6.4%  Consistent with prediabetes  >=6.5%  Consistent with diabetes    High levels of fetal hemoglobin interfere with the HbA1C  assay. Heterozygous hemoglobin variants (HbS, HgC, etc)do  not significantly interfere with this assay.   However, presence of multiple variants may affect accuracy.               Extra Tube       Hold         Hyaline Casts, UA       >10         Immature Grans (Abs)   0.04  Comment: Mild elevation in immature granulocytes is non specific and can be seen in a variety of conditions including stress response, acute inflammation, trauma and pregnancy. Correlation with other laboratory and clinical findings is essential.       0.03  Comment: Mild elevation in immature granulocytes is non specific and can be seen in a variety of conditions including stress response, acute inflammation, trauma and pregnancy. Correlation with other laboratory and clinical findings is essential.       Immature  Granulocytes   0.6       0.4       Ketones, UA       1+         Lactic Acid Level               Leukocyte Esterase, UA       Trace         Lymph #   1.25       1.59       Lymph %   17.8       20.6       Magnesium    1.6             MCH   32.5       32.4       MCHC   35.1       35.2       MCV   92       92       Microscopic Comment         Comment: Other formed elements not mentioned in the report are not present in the microscopic examination.         Mono #   0.83       0.96       Mono %   11.8       12.4       MPV   10.2       10.4       Neut %   65.8       63.7       NITRITE UA       Negative         nRBC   0       0       Blood, UA       Negative         pH, UA       5.0         Platelet Count   247       276       Potassium   4.4       4.2       PROTEIN TOTAL   6.4       6.9       Protein, UA       2+  Comment: Recommend a 24 hour urine protein or a urine protein/creatinine ratio if globulin induced proteinuria is clinically suspected.         RBC   3.97       4.13       RBC, UA       0         RDW   12.1       12.1       Sodium   140       140       Spec Grav UA       1.025         Squam Epithel, UA       7         Troponin I High Sensitivity     6     6       Urobilinogen, UA       1.0         WBC, UA       17         WBC   7.04       7.73       Yeast, UA       None                                Significant Imaging: I have reviewed all pertinent imaging results/findings within the past 24 hours.

## 2025-07-02 NOTE — ASSESSMENT & PLAN NOTE
Patient's blood pressure range in the last 24 hours was: BP  Min: 76/48  Max: 130/65.The patient's inpatient anti-hypertensive regimen is listed below:  Current Antihypertensives  , , Oral    Plan  - BP is uncontrolled, will adjust as follows:  Patient continues with borderline hypotension.  We will hold antihypertensive medications at this time.  Monitor blood pressures closely and resume as needed.

## 2025-07-02 NOTE — PLAN OF CARE
[place] [facility]  Initial Discharge Assessment       Primary Care Provider: Tulio Guillory MD    Admission Diagnosis: Renal failure [N19]  Hypotension [I95.9]    Admission Date: 7/1/2025  Expected Discharge Date: 7/3/2025    Transition of Care Barriers: None    Payor: [employer] MANAGED MEDICARE / Plan: [employer] MEDICARE PLAN PPO / Product Type: Medicare Advantage /     Extended Emergency Contact Information  Primary Emergency Contact: Liz Allen   [facility]  Home Phone: [phone]  Relation: Spouse    Discharge Plan A: Home with family  Discharge Plan B: Home with family      [facility] [place] [address]  [address]  [place]  [place] [address]  Phone: [phone] Fax: [phone]    [facility] [place], [place] - [address] [address]  Phone: [phone] Fax: [phone]      Initial Assessment (most recent)       Adult Discharge Assessment - 07/02/25 1106          Discharge Assessment    Assessment Type Discharge Planning Assessment     Confirmed/corrected address, phone number and insurance Yes     Confirmed Demographics Correct on Facesheet     Source of Information patient     People in Home spouse     Name(s) of People in Home Teri Brantley     Facility Arrived From: [facility]     Do you expect to return to your current living situation? Yes     Do you have help at home or someone to help you manage your care at home? Yes     Who are your caregiver(s) and their phone number(s)? Liz Allen- Wife- [phone]     Prior to hospitilization cognitive status: Unable to Assess     Current cognitive status: Alert/Oriented     Walking or Climbing Stairs Difficulty no     Dressing/Bathing Difficulty no     Home Layout Able to live on 1st floor     Equipment Currently Used at Home none     Readmission within 30 days? No     Patient currently being followed by outpatient case management? No     Do you currently  have service(s) that help you manage your care at home? No     Do you take prescription medications? Yes     Do you have prescription coverage? Yes     Do you have any problems affording any of your prescribed medications? No     Is the patient taking medications as prescribed? yes     Who is going to help you get home at discharge? Liz Allen-     How do you get to doctors appointments? car, drives self     Are you on dialysis? No     Do you take coumadin? No     Discharge Plan A Home with family     Discharge Plan B Home with family     DME Needed Upon Discharge  none     Discharge Plan discussed with: Patient     Transition of Care Barriers None                 SW completed an initial assessment by the patient's bedside. Patient has no needs as ofnow from SW. SW will bring patient pamphlet for any questions or resources.

## 2025-07-02 NOTE — H&P
[facility] Medicine  History & Physical    Patient Name: Jose Elias Allen  MRN: [id number]  Patient Class: IP- Inpatient  Admission Date: 7/1/2025  Attending Physician: Epifanio Barillas III, MD   Primary Care Provider: Tulio Guillory MD         Patient information was obtained from patient and ER records.     Subjective:     Principal Problem:ISAMAR (acute kidney injury)    Chief Complaint:   Chief Complaint   Patient presents with    Hypotension     Sent from [facility] by EMS. N/V/D x 3 days while out at the [place]. Reports weakness, dizziness. Hypotensive at the clinic. 1100ml NS Bolus enroute to ER--still 70's SBP 70's. CBG 120s per EMS        HPI: ED HPI:  Chief complaint:  From Nurse Triage:  Hypotension (Sent from [facility] by EMS. N/V/D x 3 days while out at the [place]. Reports weakness, dizziness. Hypotensive at the clinic. 1100ml NS Bolus enroute to ER--still 70's SBP 70's. CBG 120s per EMS)     HISTORY OF PRESENT ILLNES:  Jose Elias Allen is a 69 y.o. male  has a past medical history of Head trauma, Hypertension, and PTSD (post-traumatic stress disorder). presenting with Hypotension (Sent from [facility] by EMS. N/V/D x 3 days while out at the [place]. Reports weakness, dizziness. Hypotensive at the clinic. 1100ml NS Bolus enroute to ER--still 70's SBP 70's. CBG 120s per EMS).  No chest pain no shortness of breath no fever.  Patient does admit to large amount of watery diarrhea.    IM HPI:  Agree with above.  Patient reports nausea, vomiting, diarrhea that began approximately 4 days ago.  Patient reports nausea and vomiting resolved after the 1st night however diarrhea has continued.  Patient states he has been working outside and his camp in the heat and not hydrating.  BUN and creatinine elevated at 90 and 8.0 in ED. Patient denies past medical history kidney disease/renal failure.  A.m. labs pending.  Believe this is acute kidney injury secondary to dehydration.  IV fluids  ordered for rehydration.  Urinalysis positive for leukocytes.  IV antibiotics ordered.  Urine and blood cultures pending.  Patient afebrile without leukocytosis.  Patient does report history of hypertension.  Blood pressure soft since admission.  Believe this is secondary to dehydration.  We will hold antihypertensive medications at this time.  Monitor blood pressures closely and resume as needed.  Patient reports continued loose stools.  Sample collected to rule out C diff.  Results pending.    Past Medical History:   Diagnosis Date    Diabetes mellitus     Head trauma     Hypertension     PTSD (post-traumatic stress disorder)        Past Surgical History:   Procedure Laterality Date    APPENDECTOMY         Review of patient's allergies indicates:  No Known Allergies    No current facility-administered medications on file prior to encounter.     Current Outpatient Medications on File Prior to Encounter   Medication Sig    atorvastatin (LIPITOR) 80 MG tablet Take 80 mg by mouth.    benazepriL (LOTENSIN) 20 MG tablet Take 20 mg by mouth.    metFORMIN (GLUCOPHAGE) 500 MG tablet Take 1,000 mg by mouth.    MYRBETRIQ 50 mg Tb24 Take 1 tablet by mouth.    omeprazole (PRILOSEC) 20 MG capsule Take 20 mg by mouth.    LORazepam (ATIVAN) 0.5 MG tablet Take by mouth.     Family History    None       Tobacco Use    Smoking status: Every Day     Types: Cigarettes    Smokeless tobacco: Not on file   Substance and Sexual Activity    Alcohol use: No    Drug use: No    Sexual activity: Not on file     Review of Systems   Constitutional:  Positive for fatigue. Negative for activity change, appetite change, chills, diaphoresis and fever.   HENT:  Negative for congestion, ear pain, facial swelling, postnasal drip, rhinorrhea, sinus pain, sore throat and trouble swallowing.    Eyes:  Negative for photophobia, pain, redness and visual disturbance.   Respiratory:  Negative for cough, chest tightness, shortness of breath and wheezing.     Cardiovascular:  Negative for chest pain, palpitations and leg swelling.   Gastrointestinal:  Positive for diarrhea, nausea and vomiting. Negative for abdominal distention, abdominal pain, blood in stool and constipation.   Endocrine: Negative for polydipsia, polyphagia and polyuria.   Genitourinary:  Negative for difficulty urinating, dysuria, flank pain, frequency, hematuria, penile pain, testicular pain and urgency.   Musculoskeletal:  Negative for arthralgias, myalgias and neck stiffness.   Skin:  Negative for pallor, rash and wound.   Allergic/Immunologic: Negative.    Neurological:  Positive for weakness. Negative for dizziness, tremors, seizures, syncope, light-headedness and headaches.   Hematological: Negative.    Psychiatric/Behavioral:  Negative for agitation, confusion and sleep disturbance. The patient is not nervous/anxious.      Objective:     Vital Signs (Most Recent):  Temp: 97.6 °F (36.4 °C) (07/02/25 0808)  Pulse: 67 (07/02/25 0808)  Resp: 18 (07/02/25 0808)  BP: (!) 106/57 (07/02/25 0808)  SpO2: 97 % (07/02/25 0808) Vital Signs (24h Range):  Temp:  [97.6 °F (36.4 °C)-97.9 °F (36.6 °C)] 97.6 °F (36.4 °C)  Pulse:  [62-84] 67  Resp:  [15-22] 18  SpO2:  [96 %-100 %] 97 %  BP: ()/(41-65) 106/57     Weight: 125.2 kg (276 lb)  Body mass index is 37.43 kg/m².     Physical Exam  Vitals and nursing note reviewed.   Constitutional:       General: He is not in acute distress.     Appearance: Normal appearance. He is not ill-appearing.   HENT:      Head: Normocephalic and atraumatic.      Nose: Nose normal. No congestion or rhinorrhea.      Mouth/Throat:      Mouth: Mucous membranes are dry.      Pharynx: Oropharynx is clear. No oropharyngeal exudate or posterior oropharyngeal erythema.   Eyes:      General: No scleral icterus.     Extraocular Movements: Extraocular movements intact.      Conjunctiva/sclera: Conjunctivae normal.      Pupils: Pupils are equal, round, and reactive to light.   Neck:       Vascular: No carotid bruit.   Cardiovascular:      Rate and Rhythm: Normal rate and regular rhythm.      Pulses: Normal pulses.      Heart sounds: Normal heart sounds. No murmur heard.  Pulmonary:      Effort: Pulmonary effort is normal. No respiratory distress.      Breath sounds: Normal breath sounds. No wheezing, rhonchi or rales.   Abdominal:      General: Bowel sounds are normal. There is no distension.      Palpations: Abdomen is soft.      Tenderness: There is no abdominal tenderness. There is no guarding or rebound.   Musculoskeletal:         General: Normal range of motion.      Cervical back: Normal range of motion and neck supple.      Right lower leg: No edema.      Left lower leg: No edema.   Lymphadenopathy:      Cervical: No cervical adenopathy.   Skin:     General: Skin is warm and dry.      Capillary Refill: Capillary refill takes less than 2 seconds.   Neurological:      General: No focal deficit present.      Mental Status: He is alert and oriented to person, place, and time.      Cranial Nerves: No cranial nerve deficit.      Motor: Weakness present.   Psychiatric:         Mood and Affect: Mood normal.         Behavior: Behavior normal.         Thought Content: Thought content normal.         Judgment: Judgment normal.              CRANIAL NERVES     CN III, IV, VI   Pupils are equal, round, and reactive to light.       Significant Labs: All pertinent labs within the past 24 hours have been reviewed.  Recent Lab Results  (Last 5 results in the past 24 hours)        07/02/25  1002   07/02/25  0857   07/01/25  1849   07/01/25  1836   07/01/25  1737        Albumin   3.4       3.5       ALP   70       76       ALT   35       38       Anion Gap   9       14       Appearance, UA       Cloudy         AST   31       34       Bacteria, UA       None         Baso #   0.03       0.03       Basophil %   0.4       0.4       Bilirubin (UA)       2+  Comment: Positive urine bilirubin is not confirmed. Correlate  with serum bilirubin and clinical presentation.         BILIRUBIN TOTAL   0.4  Comment: For infants and newborns, interpretation of results should be based   on gestational age, weight and in agreement with clinical   observations.    Premature Infant recommended reference ranges:   0-24 hours:  <8.0 mg/dL   24-48 hours: <12.0 mg/dL   3-5 days:    <15.0 mg/dL   6-29 days:   <15.0 mg/dL       0.4  Comment: For infants and newborns, interpretation of results should be based   on gestational age, weight and in agreement with clinical   observations.    Premature Infant recommended reference ranges:   0-24 hours:  <8.0 mg/dL   24-48 hours: <12.0 mg/dL   3-5 days:    <15.0 mg/dL   6-29 days:   <15.0 mg/dL       BUN   78       90       Ca Oxalate Aaliyah, UA       Few         Calcium   8.4       8.4       Chloride   112       110       CO2   19       16       Color, UA       Yellow                        Creatinine   4.6       8.0       eGFR   13  Comment: Estimated GFR calculated using the CKD-EPI creatinine (2021) equation.       7  Comment: Estimated GFR calculated using the CKD-EPI creatinine (2021) equation.       Eos #   0.25       0.19       Eos %   3.6       2.5       Estimated Avg Glucose 114               Glucose   100       84       Glucose, UA       Negative         Gran # (ANC)   4.64       4.93       Granular Casts, UA       2         Hematocrit   36.7       38.1       Hemoglobin   12.9       13.4       Hemoglobin A1C External 5.6  Comment: ADA Screening Guidelines:  5.7-6.4%  Consistent with prediabetes  >=6.5%  Consistent with diabetes    High levels of fetal hemoglobin interfere with the HbA1C  assay. Heterozygous hemoglobin variants (HbS, HgC, etc)do  not significantly interfere with this assay.   However, presence of multiple variants may affect accuracy.               Extra Tube       Hold         Hyaline Casts, UA       >10         Immature Grans (Abs)   0.04  Comment: Mild elevation in immature  granulocytes is non specific and can be seen in a variety of conditions including stress response, acute inflammation, trauma and pregnancy. Correlation with other laboratory and clinical findings is essential.       0.03  Comment: Mild elevation in immature granulocytes is non specific and can be seen in a variety of conditions including stress response, acute inflammation, trauma and pregnancy. Correlation with other laboratory and clinical findings is essential.       Immature Granulocytes   0.6       0.4       Ketones, UA       1+         Lactic Acid Level               Leukocyte Esterase, UA       Trace         Lymph #   1.25       1.59       Lymph %   17.8       20.6       Magnesium    1.6             MCH   32.5       32.4       MCHC   35.1       35.2       MCV   92       92       Microscopic Comment         Comment: Other formed elements not mentioned in the report are not present in the microscopic examination.         Mono #   0.83       0.96       Mono %   11.8       12.4       MPV   10.2       10.4       Neut %   65.8       63.7       NITRITE UA       Negative         nRBC   0       0       Blood, UA       Negative         pH, UA       5.0         Platelet Count   247       276       Potassium   4.4       4.2       PROTEIN TOTAL   6.4       6.9       Protein, UA       2+  Comment: Recommend a 24 hour urine protein or a urine protein/creatinine ratio if globulin induced proteinuria is clinically suspected.         RBC   3.97       4.13       RBC, UA       0         RDW   12.1       12.1       Sodium   140       140       Spec Grav UA       1.025         Squam Epithel, UA       7         Troponin I High Sensitivity     6     6       Urobilinogen, UA       1.0         WBC, UA       17         WBC   7.04       7.73       Yeast, UA       None                                Significant Imaging: I have reviewed all pertinent imaging results/findings within the past 24 hours.  Assessment/Plan:     Assessment &  Plan  ISAMAR (acute kidney injury)  ISAMAR is likely due to pre-renal azotemia due to dehydration. Baseline creatinine is unknown. Most recent creatinine and eGFR are listed below.  Recent Labs     07/01/25  1737 07/02/25  0857   CREATININE 8.0* 4.6*   EGFRNORACEVR 7* 13*      Plan  - ISAMAR is improving  - Avoid nephrotoxins and renally dose meds for GFR listed above  - Monitor urine output, serial BMP, and adjust therapy as needed  - continue IV fluid rehydration.  Renal ultrasound ordered.  We will add CK to a.m. labs.  Dehydration  Continue IV fluid rehydration.  Monitor labs.  Encouraged p.o. fluid intake.    Type 2 diabetes mellitus without complication, without long-term current use of insulin  Accu-Cheks with SSI ordered.  We will hold oral antihyperglycemics during admission.    Primary hypertension  Patient's blood pressure range in the last 24 hours was: BP  Min: 76/48  Max: 130/65.The patient's inpatient anti-hypertensive regimen is listed below:  Current Antihypertensives  , , Oral    Plan  - BP is uncontrolled, will adjust as follows:  Patient continues with borderline hypotension.  We will hold antihypertensive medications at this time.  Monitor blood pressures closely and resume as needed.    HLD (hyperlipidemia)  Statin ordered.    On deep vein thrombosis (DVT) prophylaxis  Lovenox ordered.    Diarrhea  Continue IV fluid rehydration.  Encouraged p.o. fluid intake.  Stool for C diff collected.  Results pending.      VTE Risk Mitigation (From admission, onward)           Ordered     enoxaparin injection 30 mg  Daily         07/02/25 0946     IP VTE LOW RISK PATIENT  Once         07/01/25 2237     Place KRIS hose  Until discontinued         07/01/25 2237                    SDOH Screening:  The patient declined to be screened for utility difficulties, food insecurity, transport difficulties, housing insecurity, and interpersonal safety, so no concerns could be identified this admission.                          Pharmacist Renal Dose Adjustment Note    Jose Elias Allen is a 69 y.o. male being treated with the medication lovenox    Patient Data:    Vital Signs (Most Recent):  Temp: 97.6 °F (36.4 °C) (07/02/25 0808)  Pulse: 67 (07/02/25 0808)  Resp: 18 (07/02/25 0808)  BP: (!) 106/57 (07/02/25 0808)  SpO2: 97 % (07/02/25 0808) Vital Signs (72h Range):  Temp:  [97.6 °F (36.4 °C)-97.9 °F (36.6 °C)]   Pulse:  [62-84]   Resp:  [15-22]   BP: ()/(41-65)   SpO2:  [96 %-100 %]      Recent Labs   Lab 07/01/25  1737 07/02/25 0857   CREATININE 8.0* 4.6*     Serum creatinine: 4.6 mg/dL (H) 07/02/25 0857  Estimated creatinine clearance: 21.3 mL/min (A)    Medication: lovenox 40 mg SQ daily will be changed to lovenox 30 m g SQ daily for CrCl <30 ml/min     Pharmacist's Name: Amy Randall NP  Department of Hospital Medicine  [facility]

## 2025-07-02 NOTE — ASSESSMENT & PLAN NOTE
Continue IV fluid rehydration.  Encouraged p.o. fluid intake.  Stool for C diff collected.  Results pending.

## 2025-07-02 NOTE — HPI
ED HPI:  Chief complaint:  From Nurse Triage:  Hypotension (Sent from [facility] by EMS. [place]/V/D x [place]. Reports weakness, dizziness. Hypotensive at the clinic. 1100ml NS Bolus enroute to ER--still 70's SBP 70's. CBG 120s per EMS)     HISTORY OF PRESENT ILLNES:  Jose Elias Allen is a 69 y.o. male  has a past medical history of Head trauma, Hypertension, and PTSD (post-traumatic stress disorder). presenting with Hypotension (Sent from [facility] by EMS. [place]/V/D x [place]. Reports weakness, dizziness. Hypotensive at the clinic. 1100ml NS Bolus enroute to ER--still 70's SBP 70's. CBG 120s per EMS).  No chest pain no shortness of breath no fever.  Patient does admit to large amount of watery diarrhea.    IM HPI:  Agree with above.  Patient reports nausea, vomiting, diarrhea that began approximately 4 days ago.  Patient reports nausea and vomiting resolved after the 1st night however diarrhea has continued.  Patient states he has been working outside and his camp in the heat and not hydrating.  BUN and creatinine elevated at 90 and 8.0 in ED. Patient denies past medical history kidney disease/renal failure.  A.m. labs pending.  Believe this is acute kidney injury secondary to dehydration.  IV fluids ordered for rehydration.  Urinalysis positive for leukocytes.  IV antibiotics ordered.  Urine and blood cultures pending.  Patient afebrile without leukocytosis.  Patient does report history of hypertension.  Blood pressure soft since admission.  Believe this is secondary to dehydration.  We will hold antihypertensive medications at this time.  Monitor blood pressures closely and resume as needed.  Patient reports continued loose stools.  Sample collected to rule out C diff.  Results pending.

## 2025-07-02 NOTE — ASSESSMENT & PLAN NOTE
ISAMAR is likely due to pre-renal azotemia due to dehydration. Baseline creatinine is unknown. Most recent creatinine and eGFR are listed below.  Recent Labs     07/01/25  1737 07/02/25  0857   CREATININE 8.0* 4.6*   EGFRNORACEVR 7* 13*      Plan  - ISAMAR is improving  - Avoid nephrotoxins and renally dose meds for GFR listed above  - Monitor urine output, serial BMP, and adjust therapy as needed  - continue IV fluid rehydration.  Renal ultrasound ordered.  We will add CK to a.m. labs.

## 2025-07-02 NOTE — PLAN OF CARE
Plan of care reviewed with patient. Peripheral IV in place and saline locked. Pt tolerated medications well. PRN Melatonin 6mg given per MD orders. Vital signs stable, No acute distress noted. Pt free from falls and injury. Questions and concerns addressed. Pt educated on PRN medication and PRN medication administration. Pt belongings and call bell with in reach.     Problem: Adult Inpatient Plan of Care  Goal: Plan of Care Review  7/2/2025 0603 by Herve Sands LPN  Outcome: Progressing  7/1/2025 2259 by Herve Sands LPN  Outcome: Progressing  Goal: Patient-Specific Goal (Individualized)  7/2/2025 0603 by Herve Sands LPN  Outcome: Progressing  7/1/2025 2259 by Herve Sands LPN  Outcome: Progressing  Goal: Absence of Hospital-Acquired Illness or Injury  7/2/2025 0603 by Herve Sands LPN  Outcome: Progressing  7/1/2025 2259 by Herve Sands LPN  Outcome: Progressing  Goal: Optimal Comfort and Wellbeing  7/2/2025 0603 by Herve Sands LPN  Outcome: Progressing  7/1/2025 2259 by Herve Sands LPN  Outcome: Progressing  Goal: Readiness for Transition of Care  7/2/2025 0603 by Herve Sands LPN  Outcome: Progressing  7/1/2025 2259 by Herve Sands LPN  Outcome: Progressing     Problem: Nausea and Vomiting  Goal: Nausea and Vomiting Relief  7/2/2025 0603 by Herve Sands LPN  Outcome: Progressing  7/1/2025 2259 by Herve Sands LPN  Outcome: Progressing

## 2025-07-02 NOTE — NURSING
Pt arrived from ED per ED Paramedic via cart/stretcher with abx infusing and patient belongings. Received report from Regina Elmore NRP. Vital Signs Stable; No Acute Distress Noted. Pt oriented to room and floor. Peripheral IV 18g antecubital left, infusing Flagyl @100 ml/h, condition patent and no redness .  Pt. Ambulated to restroom; Pt assessed and admitted.

## 2025-07-03 VITALS
RESPIRATION RATE: 18 BRPM | WEIGHT: 276 LBS | DIASTOLIC BLOOD PRESSURE: 55 MMHG | BODY MASS INDEX: 37.38 KG/M2 | HEIGHT: 72 IN | HEART RATE: 66 BPM | OXYGEN SATURATION: 97 % | TEMPERATURE: 98 F | SYSTOLIC BLOOD PRESSURE: 131 MMHG

## 2025-07-03 LAB
ABSOLUTE EOSINOPHIL (OHS): 0.45 K/UL
ABSOLUTE MONOCYTE (OHS): 0.96 K/UL (ref 0.3–1)
ABSOLUTE NEUTROPHIL COUNT (OHS): 5.05 K/UL (ref 1.8–7.7)
ALBUMIN SERPL BCP-MCNC: 3.5 G/DL (ref 3.5–5.2)
ALP SERPL-CCNC: 78 UNIT/L (ref 40–150)
ALT SERPL W/O P-5'-P-CCNC: 34 UNIT/L (ref 10–44)
ANION GAP (OHS): 9 MMOL/L (ref 8–16)
ANION GAP (OHS): 9 MMOL/L (ref 8–16)
AST SERPL-CCNC: 29 UNIT/L (ref 11–45)
BACTERIA UR CULT: NO GROWTH
BASOPHILS # BLD AUTO: 0.04 K/UL
BASOPHILS NFR BLD AUTO: 0.5 %
BILIRUB SERPL-MCNC: 0.3 MG/DL (ref 0.1–1)
BUN SERPL-MCNC: 44 MG/DL (ref 8–23)
BUN SERPL-MCNC: 49 MG/DL (ref 8–23)
CALCIUM SERPL-MCNC: 8 MG/DL (ref 8.7–10.5)
CALCIUM SERPL-MCNC: 8.4 MG/DL (ref 8.7–10.5)
CHLORIDE SERPL-SCNC: 114 MMOL/L (ref 95–110)
CHLORIDE SERPL-SCNC: 114 MMOL/L (ref 95–110)
CK SERPL-CCNC: 101 U/L (ref 20–200)
CK SERPL-CCNC: 119 U/L (ref 20–200)
CO2 SERPL-SCNC: 20 MMOL/L (ref 23–29)
CO2 SERPL-SCNC: 20 MMOL/L (ref 23–29)
CREAT SERPL-MCNC: 1.4 MG/DL (ref 0.5–1.4)
CREAT SERPL-MCNC: 1.9 MG/DL (ref 0.5–1.4)
ERYTHROCYTE [DISTWIDTH] IN BLOOD BY AUTOMATED COUNT: 12.2 % (ref 11.5–14.5)
GFR SERPLBLD CREATININE-BSD FMLA CKD-EPI: 38 ML/MIN/1.73/M2
GFR SERPLBLD CREATININE-BSD FMLA CKD-EPI: 55 ML/MIN/1.73/M2
GLUCOSE SERPL-MCNC: 89 MG/DL (ref 70–110)
GLUCOSE SERPL-MCNC: 98 MG/DL (ref 70–110)
HCT VFR BLD AUTO: 38.2 % (ref 40–54)
HGB BLD-MCNC: 13.6 GM/DL (ref 14–18)
IMM GRANULOCYTES # BLD AUTO: 0.06 K/UL (ref 0–0.04)
IMM GRANULOCYTES NFR BLD AUTO: 0.7 % (ref 0–0.5)
LYMPHOCYTES # BLD AUTO: 1.75 K/UL (ref 1–4.8)
MAGNESIUM SERPL-MCNC: 1.3 MG/DL (ref 1.6–2.6)
MCH RBC QN AUTO: 32.9 PG (ref 27–31)
MCHC RBC AUTO-ENTMCNC: 35.6 G/DL (ref 32–36)
MCV RBC AUTO: 92 FL (ref 82–98)
NUCLEATED RBC (/100WBC) (OHS): 0 /100 WBC
PLATELET # BLD AUTO: 261 K/UL (ref 150–450)
PMV BLD AUTO: 10.6 FL (ref 9.2–12.9)
POCT GLUCOSE: 111 MG/DL (ref 70–110)
POCT GLUCOSE: 91 MG/DL (ref 70–110)
POTASSIUM SERPL-SCNC: 4.6 MMOL/L (ref 3.5–5.1)
POTASSIUM SERPL-SCNC: 4.6 MMOL/L (ref 3.5–5.1)
PROT SERPL-MCNC: 6.7 GM/DL (ref 6–8.4)
RBC # BLD AUTO: 4.14 M/UL (ref 4.6–6.2)
RELATIVE EOSINOPHIL (OHS): 5.4 %
RELATIVE LYMPHOCYTE (OHS): 21.1 % (ref 18–48)
RELATIVE MONOCYTE (OHS): 11.6 % (ref 4–15)
RELATIVE NEUTROPHIL (OHS): 60.7 % (ref 38–73)
SODIUM SERPL-SCNC: 143 MMOL/L (ref 136–145)
SODIUM SERPL-SCNC: 143 MMOL/L (ref 136–145)
WBC # BLD AUTO: 8.31 K/UL (ref 3.9–12.7)

## 2025-07-03 PROCEDURE — 83735 ASSAY OF MAGNESIUM: CPT

## 2025-07-03 PROCEDURE — 36415 COLL VENOUS BLD VENIPUNCTURE: CPT

## 2025-07-03 PROCEDURE — 80053 COMPREHEN METABOLIC PANEL: CPT

## 2025-07-03 PROCEDURE — 25000003 PHARM REV CODE 250: Performed by: INTERNAL MEDICINE

## 2025-07-03 PROCEDURE — 63600175 PHARM REV CODE 636 W HCPCS: Performed by: INTERNAL MEDICINE

## 2025-07-03 PROCEDURE — 63600175 PHARM REV CODE 636 W HCPCS

## 2025-07-03 PROCEDURE — 25000003 PHARM REV CODE 250

## 2025-07-03 PROCEDURE — 82550 ASSAY OF CK (CPK): CPT

## 2025-07-03 PROCEDURE — 85025 COMPLETE CBC W/AUTO DIFF WBC: CPT

## 2025-07-03 RX ORDER — ENOXAPARIN SODIUM 100 MG/ML
40 INJECTION SUBCUTANEOUS EVERY 24 HOURS
Status: DISCONTINUED | OUTPATIENT
Start: 2025-07-03 | End: 2025-07-03 | Stop reason: HOSPADM

## 2025-07-03 RX ORDER — MAGNESIUM SULFATE HEPTAHYDRATE 40 MG/ML
2 INJECTION, SOLUTION INTRAVENOUS ONCE
Status: COMPLETED | OUTPATIENT
Start: 2025-07-03 | End: 2025-07-03

## 2025-07-03 RX ORDER — CEFTRIAXONE 2 G/1
2 INJECTION, POWDER, FOR SOLUTION INTRAMUSCULAR; INTRAVENOUS
Status: DISCONTINUED | OUTPATIENT
Start: 2025-07-03 | End: 2025-07-03 | Stop reason: HOSPADM

## 2025-07-03 RX ADMIN — FLUOXETINE HYDROCHLORIDE 40 MG: 20 CAPSULE ORAL at 09:07

## 2025-07-03 RX ADMIN — CEFTRIAXONE 2 G: 2 INJECTION, POWDER, FOR SOLUTION INTRAMUSCULAR; INTRAVENOUS at 09:07

## 2025-07-03 RX ADMIN — SODIUM CHLORIDE: 9 INJECTION, SOLUTION INTRAVENOUS at 08:07

## 2025-07-03 RX ADMIN — MAGNESIUM SULFATE HEPTAHYDRATE 2 G: 40 INJECTION, SOLUTION INTRAVENOUS at 09:07

## 2025-07-03 NOTE — DISCHARGE INSTRUCTIONS
**Avoid NSAIDs (aspirin, Aleve, naproxen, Advil, Motrin, ibuprofen).    **Endure adequate fluid intake.    **Avoid strenuous activity for the next 3-4 days.

## 2025-07-03 NOTE — PLAN OF CARE
07/03/25 1337   Medicare Message   Important Message from Medicare regarding Discharge Appeal Rights Given to patient/caregiver;Explained to patient/caregiver;Signed/date by patient/caregiver   Date IMM was signed 07/03/25   Time IMM was signed 1209

## 2025-07-03 NOTE — PROGRESS NOTES
Pharmacist Renal Dose Adjustment Note    Jose Elias Allen is a 69 y.o. male being treated with the medication enoxaparin    Patient Data:    Vital Signs (Most Recent):  Temp: 97.6 °F (36.4 °C) (07/03/25 0435)  Pulse: 69 (07/03/25 0435)  Resp: 18 (07/03/25 0435)  BP: 111/67 (07/03/25 0435)  SpO2: 96 % (07/03/25 0435) Vital Signs (72h Range):  Temp:  [97.6 °F (36.4 °C)-98.7 °F (37.1 °C)]   Pulse:  [62-84]   Resp:  [15-22]   BP: ()/(41-69)   SpO2:  [96 %-100 %]      Recent Labs   Lab 07/01/25  1737 07/02/25  0857 07/03/25  0518   CREATININE 8.0* 4.6* 1.9*     Serum creatinine: 1.9 mg/dL (H) 07/03/25 0518  Estimated creatinine clearance: 51.5 mL/min (A)    Medication:enoxaparin 30 mg Q24h will be changed to medication:enoxaparin 40 mg Q24h     Pharmacist's Name: ALONA MADRID  Pharmacist's Extension: [phone]

## 2025-07-03 NOTE — PLAN OF CARE
Plan of care reviewed and ongoing with pt. 20 gauge IV to R hand infusing NS @ 150, 18 gauge IV to to L AC saline locked, room air tolerating well. Ambulated to BR independently free of falls. Call light and personal items within reach of patient.       Problem: Adult Inpatient Plan of Care  Goal: Plan of Care Review  Outcome: Not Progressing  Goal: Patient-Specific Goal (Individualized)  Outcome: Not Progressing  Goal: Absence of Hospital-Acquired Illness or Injury  Outcome: Not Progressing  Goal: Optimal Comfort and Wellbeing  Outcome: Not Progressing  Goal: Readiness for Transition of Care  Outcome: Not Progressing     Problem: Nausea and Vomiting  Goal: Nausea and Vomiting Relief  Outcome: Not Progressing     Problem: Diabetes Comorbidity  Goal: Blood Glucose Level Within Targeted Range  Outcome: Not Progressing     Problem: Acute Kidney Injury/Impairment  Goal: Fluid and Electrolyte Balance  Outcome: Not Progressing  Goal: Improved Oral Intake  Outcome: Not Progressing  Goal: Effective Renal Function  Outcome: Not Progressing     Problem: Social Isolation  Goal: Social Connection Supported  Outcome: Not Progressing     Problem: Infection  Goal: Absence of Infection Signs and Symptoms  Outcome: Not Progressing

## 2025-07-03 NOTE — DISCHARGE SUMMARY
[facility] [place] [facility]  Discharge Summary      Patient Name: Jose Elias Allen  MRN: [id number]  RICHELLE: [id number]  Patient Class: IP- Inpatient  Admission Date: 7/1/2025  Hospital Length of Stay: 2 days  Discharge Date and Time: No discharge date for patient encounter.  Attending Physician: Epifanio Barillas III, MD   Discharging Provider: Aydee Randall NP  Primary Care Provider: Coretta Jesus NP    Primary Care Team: Networked reference to record PCT     HPI:   ED HPI:  Chief complaint:  From Nurse Triage:  Hypotension (Sent from [facility] by EMS. N/V/D x 3 days while out at the [place]. Reports weakness, dizziness. Hypotensive at the clinic. 1100ml NS Bolus enroute to ER--still 70's SBP 70's. CBG 120s per EMS)     HISTORY OF PRESENT ILLNES:  Jose Elias Allen is a 69 y.o. male  has a past medical history of Head trauma, Hypertension, and PTSD (post-traumatic stress disorder). presenting with Hypotension (Sent from [facility] by EMS. N/V/D x 3 days while out at the [place]. Reports weakness, dizziness. Hypotensive at the clinic. 1100ml NS Bolus enroute to ER--still 70's SBP 70's. CBG 120s per EMS).  No chest pain no shortness of breath no fever.  Patient does admit to large amount of watery diarrhea.    IM HPI:  Agree with above.  Patient reports nausea, vomiting, diarrhea that began approximately 4 days ago.  Patient reports nausea and vomiting resolved after the 1st night however diarrhea has continued.  Patient states he has been working outside and his camp in the heat and not hydrating.  BUN and creatinine elevated at 90 and 8.0 in ED. Patient denies past medical history kidney disease/renal failure.  A.m. labs pending.  Believe this is acute kidney injury secondary to dehydration.  IV fluids ordered for rehydration.  Urinalysis positive for leukocytes.  IV antibiotics ordered.  Urine and blood cultures pending.  Patient afebrile without leukocytosis.  Patient does report history of  hypertension.  Blood pressure soft since admission.  Believe this is secondary to dehydration.  We will hold antihypertensive medications at this time.  Monitor blood pressures closely and resume as needed.  Patient reports continued loose stools.  Sample collected to rule out C diff.  Results pending.    * No surgery found *      Hospital Course:   DC Note:  Patient doing well.  He reports no further incidents of loose stools.  BUN and creatinine significantly improved.  Creatinine currently 1.4.  We will discharge patient home today.  Patient advised to continue adequate p.o. fluid intake at discharge.  Patient encouraged to avoid strenuous activity through the weekend.  Advised patient he will need to make sure he is hydrating well when working outside in the sun and heat.  Advised patient to follow up with PCP in one-week.     Goals of Care Treatment Preferences:  Code Status: Full Code         Consults:     Assessment & Plan  ISAMAR (acute kidney injury)  ISAMAR is likely due to pre-renal azotemia due to dehydration. Baseline creatinine is unknown. Most recent creatinine and eGFR are listed below.  Recent Labs     07/02/25  0857 07/03/25  0518 07/03/25  1409   CREATININE 4.6* 1.9* 1.4   EGFRNORACEVR 13* 38* 55*      Plan  - ISAMAR is improving  - Avoid nephrotoxins and renally dose meds for GFR listed above  - Monitor urine output, serial BMP, and adjust therapy as needed  - continue IV fluid rehydration.  Renal ultrasound ordered.  We will add CK to a.m. labs.    DC Note:  Renal function significantly improved.  We will discharge patient home today.  Continued good p.o. hydration encouraged.  Dehydration  Continue IV fluid rehydration.  Monitor labs.  Encouraged p.o. fluid intake.        Type 2 diabetes mellitus without complication, without long-term current use of insulin  Accu-Cheks with SSI ordered.  We will hold oral antihyperglycemics during admission.    Primary hypertension  Patient's blood pressure range in  the last 24 hours was: BP  Min: 111/67  Max: 140/67.The patient's inpatient anti-hypertensive regimen is listed below:  Current Antihypertensives  , , Oral    Plan  - BP is uncontrolled, will adjust as follows:  Patient continues with borderline hypotension.  We will hold antihypertensive medications at this time.  Monitor blood pressures closely and resume as needed.    HLD (hyperlipidemia)  Statin ordered.    On deep vein thrombosis (DVT) prophylaxis  Lovenox ordered.    Diarrhea  Continue IV fluid rehydration.  Encouraged p.o. fluid intake.  Stool for C diff collected.  Results pending.    DC Note:  Resolved.      Final Active Diagnoses:    Diagnosis Date Noted POA    PRINCIPAL PROBLEM:  ISAMAR (acute kidney injury) [N17.9] 07/02/2025 Yes    Dehydration [E86.0] 07/02/2025 Yes    Type 2 diabetes mellitus without complication, without long-term current use of insulin [E11.9] 07/02/2025 Yes    Primary hypertension [I10] 07/02/2025 Yes    HLD (hyperlipidemia) [E78.5] 07/02/2025 Yes    On deep vein thrombosis (DVT) prophylaxis [Z79.899] 07/02/2025 Not Applicable    Diarrhea [R19.7] 07/02/2025 Yes      Problems Resolved During this Admission:       Discharged Condition: stable    Disposition: Home or Self Care    Follow Up:   Follow-up Information       Coretta Jesus NP Follow up on 7/10/2025.    Specialty: Family Medicine  Why: at 10:30am with Maddie Osorio NP  -Please bring ID, insurance cards and all current medications.  -If not vaccinated, this clinic recommends wearing a mask but it is not mandatory.  Contact information:  [facility] [address] [address]  [phone]               Coretta Jesus, PAWEL. Schedule an appointment as soon as possible for a visit in 1 week(s).    Specialty: Family Medicine  Contact information:  [facility] [address] [address]  [phone]                           Patient Instructions:      Diet Cardiac     Diet renal     Diet diabetic     Activity as tolerated        Significant Diagnostic Studies: Labs: All labs within the past 24 hours have been reviewed    Pending Diagnostic Studies:       None           Medications:  Reconciled Home Medications:      Medication List        CONTINUE taking these medications      atorvastatin 80 MG tablet  Commonly known as: LIPITOR  Take 80 mg by mouth.     benazepriL 20 MG tablet  Commonly known as: LOTENSIN  Take 20 mg by mouth.     FLUoxetine 40 MG capsule  Take 40 mg by mouth once daily.     LORazepam 0.5 MG tablet  Commonly known as: ATIVAN  Take by mouth.     metFORMIN 500 MG tablet  Commonly known as: GLUCOPHAGE  Take 1,000 mg by mouth.     MYRBETRIQ 50 mg Tb24  Generic drug: mirabegron  Take 1 tablet by mouth.     omeprazole 20 MG capsule  Commonly known as: PRILOSEC  Take 20 mg by mouth.              Indwelling Lines/Drains at time of discharge:   Lines/Drains/Airways       None                       Time spent on the discharge of patient: 35 minutes         Aydee Randall NP  Department of Hospital Medicine  [facility]

## 2025-07-03 NOTE — HOSPITAL COURSE
DC Note:  Patient doing well.  He reports no further incidents of loose stools.  BUN and creatinine significantly improved.  Creatinine currently 1.4.  We will discharge patient home today.  Patient advised to continue adequate p.o. fluid intake at discharge.  Patient encouraged to avoid strenuous activity through the weekend.  Advised patient he will need to make sure he is hydrating well when working outside in the sun and heat.  Advised patient to follow up with PCP in one-week.

## 2025-07-03 NOTE — PROGRESS NOTES
[facility]  Adult Nutrition  Consult Note    SUMMARY     Recommendations  1. Rec'd Renal Consistent CHO diet.   2. RD to provide diet education prior to discharge.   Goals:   1. Pt will consume > 75% EEN/EPN by next RD follow up.  Nutrition Goal Status: new  Communication of RD Recs: other (comment) (Documented in POC)    Nutrition Discharge Planning     Nutrition Discharge Planning: Too early to determine, pending clinical course    Malnutrition Assessment  NFPE not warranted at this time. Pt does not meet positive malnutrition criteria.     Reason for Assessment    Reason For Assessment: identified at risk by screening criteria (MST = 3)  Diagnosis: other (see comments) (ISAMAR (acute kidney injury))  General Information Comments: RD triggered for MST >/=3. PT here with an ISAMAR. Pt has a hx of head trauma and is a poor historian during interview assessment. Pt reports following a Regular diet at home with no restrictions. Pt has as hx of diabetes. Noted pt's renal labs.  Pt also reports poor appetite with nausea/vomiting x 3 days. RD to follow and make rec's accordingly.    Nutrition/Diet History    Nutrition Intake History: Regular Diet  Food Preferences: None  Spiritual, Cultural Beliefs, [employer]ism Practices, Values that Affect Care: no  Food Allergies: NKFA  Factors Affecting Nutritional Intake: nausea/vomiting, decreased appetite  Nutrition-related SDOH: None Identified    Anthropometrics    Height: 6' (182.9 cm)  Height (inches): 72 in  Height Method: Stated  Weight: 125.2 kg (276 lb 0.3 oz)  Weight (lb): 276.02 lb  Weight Method: Bed Scale  Ideal Body Weight (IBW), Male: 178 lb  % Ideal Body Weight, Male (lb): 155.07 %  BMI (Calculated): 37.4  BMI Grade: 35 - 39.9 - obesity - grade II    Lab/Procedures/Meds    Pertinent Labs Reviewed: reviewed  Pertinent Labs Comments: GFR = 13, Creatining 4.6, BUN = 78  Pertinent Medications Reviewed: reviewed    Estimated/Assessed Needs    Weight Used For Calorie  Calculations: 125.2 kg (276 lb 0.3 oz)  Energy Calorie Requirements (kcal): 2065  Energy Need Method: Kistler-St Finnor  Protein Requirements:  (0.6-0.8g/kg)  Weight Used For Protein Calculations: 125.2 kg (276 lb 0.3 oz)  Fluid Requirements (mL): 2065 (1mL/kcal)  Estimated Fluid Requirement Method: RDA Method  RDA Method (mL): 2065  CHO Requirement: 68g CHO per meal (50% calories from CHO)    Nutrition Prescription Ordered    Current Diet Order: Renal Non-Dialysis  Oral Nutrition Supplement: None    Evaluation of Received Nutrient/Fluid Intake    % Kcal Needs: 0-25%  % Protein Needs: 0-25%  Energy Calories Required: not meeting needs  Protein Required: not meeting needs  Tolerance: tolerating  % Intake of Estimated Energy Needs: 0 - 25 %  % Meal Intake: 0 - 25 %    PES Statement  Inadequate energy intake related to Altered GI function secondary to nausea and vomiting x 3 days as evidenced by Intake <25% estimated needs  Status: New    Nutrition Risk    Level of Risk/Frequency of Follow-up: moderate     Monitor and Evaluation    Monitor and Evaluation: Energy intake, Food and beverage intake, Protein intake, Carbohydrate intake, Diet order, Food and nutrition knowledge, Beliefs and attitudes, Weight, Electrolyte and renal panel, Gastrointestinal profile, Glucose/endocrine profile, Inflammatory profile, Lipid profile     Nutrition Follow-Up    RD Follow-up?: Yes

## 2025-07-03 NOTE — PLAN OF CARE
Recommendations  1. Rec'd Renal Consistent CHO diet.   2. RD to provide diet education prior to discharge.   Goals:   1. Pt will consume > 75% EEN/EPN by next RD follow up.  Nutrition Goal Status: new

## 2025-07-03 NOTE — ASSESSMENT & PLAN NOTE
Patient's blood pressure range in the last 24 hours was: BP  Min: 111/67  Max: 140/67.The patient's inpatient anti-hypertensive regimen is listed below:  Current Antihypertensives  , , Oral    Plan  - BP is uncontrolled, will adjust as follows:  Patient continues with borderline hypotension.  We will hold antihypertensive medications at this time.  Monitor blood pressures closely and resume as needed.

## 2025-07-03 NOTE — ASSESSMENT & PLAN NOTE
Continue IV fluid rehydration.  Encouraged p.o. fluid intake.  Stool for C diff collected.  Results pending.    DC Note:  Resolved.

## 2025-07-07 LAB
BACTERIA BLD CULT: NORMAL
BACTERIA BLD CULT: NORMAL